# Patient Record
Sex: FEMALE | Race: WHITE | NOT HISPANIC OR LATINO | Employment: FULL TIME | URBAN - METROPOLITAN AREA
[De-identification: names, ages, dates, MRNs, and addresses within clinical notes are randomized per-mention and may not be internally consistent; named-entity substitution may affect disease eponyms.]

---

## 2023-01-13 ENCOUNTER — MEDICAL CORRESPONDENCE (OUTPATIENT)
Dept: HEALTH INFORMATION MANAGEMENT | Facility: CLINIC | Age: 28
End: 2023-01-13

## 2023-01-31 ENCOUNTER — TRANSCRIBE ORDERS (OUTPATIENT)
Dept: MATERNAL FETAL MEDICINE | Facility: CLINIC | Age: 28
End: 2023-01-31

## 2023-01-31 ENCOUNTER — TRANSFERRED RECORDS (OUTPATIENT)
Dept: HEALTH INFORMATION MANAGEMENT | Facility: CLINIC | Age: 28
End: 2023-01-31

## 2023-01-31 DIAGNOSIS — O26.90 PREGNANCY RELATED CONDITION, ANTEPARTUM: Primary | ICD-10-CM

## 2023-02-21 ENCOUNTER — PRE VISIT (OUTPATIENT)
Dept: MATERNAL FETAL MEDICINE | Facility: CLINIC | Age: 28
End: 2023-02-21
Payer: COMMERCIAL

## 2023-02-28 ENCOUNTER — OFFICE VISIT (OUTPATIENT)
Dept: MATERNAL FETAL MEDICINE | Facility: CLINIC | Age: 28
End: 2023-02-28
Attending: ADVANCED PRACTICE MIDWIFE
Payer: COMMERCIAL

## 2023-02-28 ENCOUNTER — OFFICE VISIT (OUTPATIENT)
Dept: MATERNAL FETAL MEDICINE | Facility: CLINIC | Age: 28
End: 2023-02-28
Attending: OBSTETRICS & GYNECOLOGY
Payer: COMMERCIAL

## 2023-02-28 ENCOUNTER — LAB (OUTPATIENT)
Dept: LAB | Facility: CLINIC | Age: 28
End: 2023-02-28
Attending: ADVANCED PRACTICE MIDWIFE
Payer: COMMERCIAL

## 2023-02-28 ENCOUNTER — HOSPITAL ENCOUNTER (OUTPATIENT)
Dept: ULTRASOUND IMAGING | Facility: CLINIC | Age: 28
Discharge: HOME | End: 2023-02-28
Attending: ADVANCED PRACTICE MIDWIFE
Payer: COMMERCIAL

## 2023-02-28 DIAGNOSIS — O35.EXX0 PYELECTASIS OF FETUS ON PRENATAL ULTRASOUND: Primary | ICD-10-CM

## 2023-02-28 DIAGNOSIS — O26.90 PREGNANCY RELATED CONDITION, ANTEPARTUM: ICD-10-CM

## 2023-02-28 DIAGNOSIS — O28.3 ABNORMAL PRENATAL ULTRASOUND: ICD-10-CM

## 2023-02-28 DIAGNOSIS — O28.3 ABNORMAL PRENATAL ULTRASOUND: Primary | ICD-10-CM

## 2023-02-28 DIAGNOSIS — O28.3 ECHOGENIC INTRACARDIAC FOCUS OF FETUS ON PRENATAL ULTRASOUND: ICD-10-CM

## 2023-02-28 PROCEDURE — 76811 OB US DETAILED SNGL FETUS: CPT

## 2023-02-28 PROCEDURE — 76811 OB US DETAILED SNGL FETUS: CPT | Mod: 26 | Performed by: OBSTETRICS & GYNECOLOGY

## 2023-02-28 PROCEDURE — 36415 COLL VENOUS BLD VENIPUNCTURE: CPT

## 2023-02-28 PROCEDURE — 999N000069 HC STATISTIC GENETIC COUNSELING, < 16 MIN: Performed by: GENETIC COUNSELOR, MS

## 2023-02-28 PROCEDURE — 99203 OFFICE O/P NEW LOW 30 MIN: CPT | Mod: 25 | Performed by: OBSTETRICS & GYNECOLOGY

## 2023-02-28 NOTE — PROGRESS NOTES
Please see the imaging tab for details of the ultrasound performed today.    An echogenic intracardiac focus (EIF) was noted on today's ultrasound. While this finding is seen in 3-5% of all pregnancies, it is associated with a likelihood ratio for Trisomy 21 of up to 1.8 fold. We reviewed the limitations of ultrasound and its limitations in detecting aneuploidy. Since she has not yet had genetic screening this pregnancy, we reviewed the availability of cell free DNA screening for risk refinement . After counseling the patient opted to proceed with cell free DNA screening today. A copy of the results will be forwarded to you when the results are available. Finally, we discussed that an EIF has no structural or functional implications on cardiac function and further evaluation is not necessary either prenatally or postnatally.       We reviewed that mild urinary tract dilation is seen in 1-2% of fetuses in the second trimester. This is most commonly a transient finding that resolves on subsequent scans, but may indicate impending kidney disease in about 15% of cases. Urinary tract dilation can occur as a result of a blockage or congenital anatomic abnormality of the kidney, ureter, bladder or urethra, but is most commonly due to transient or physiologic changes. In circumstances when there is reflux or obstruction the urinary tract dilation may result in increased pressure in the calyces and injury to the renal parenchyma. With increasing dilation (specifically > 10mm), there is increasing risk of associated renal/ureteral/bladder/urethral malformations. The cutoff for mild urinary tract dilation is 4 mm in the second trimester and 7 mm in the 3rd trimester.       A follow up ultrasound to reevaluate the fetal kidneys is recommended at 32 weeks and has been scheduled in our office. This ultrasound will determine if ongoing surveillance and  pediatric urology follow-up is indicated. If it has resolved at her  subsequent ultrasound then no  evaluation is indicated.     Edilma Banda MD  Specialist in Maternal-Fetal Medicine

## 2023-02-28 NOTE — NURSING NOTE
Patient presents to [facility] for L2US at 25+5 weeks due to suboptimal anatomy x 2 different attempts in primary clinic. Positive fetal movement. Denies LOF, vaginal bleeding or cramping/contractions. SBAR given to [facility] MD, see their note in Epic.

## 2023-02-28 NOTE — PROGRESS NOTES
[facility]  Genetic Counseling Consult    Patient:  Edilma Galvan YOB: 1995   Date of Service:  23   MRN: [id number]    Edilma was seen at the [facility] for comprehensive ultrasound. I met with the patient following her ultrasound at the request of Dr. Banda to discuss the options for testing for fetal chromosome abnormalities.    IMPRESSION/ PLAN   1. Today's ultrasound revealed an EIF and pyelectasis. Edilma has not had screening in this pregnancy. She has elected to pursue NIPT through [facility] and opted to screen for sex chromosome aneuploidies including fetal sex. Results are expected in 10-14 days. Edilma provided verbal permission for results to be left on her voicemail. She is aware of female sex of baby. Patient was informed that results will also be available via [employer].    PREGNANCY HISTORY   /Parity:    Age at Delivery: 28 year old  Gestational Age: 25w5d   MICHELLE: 2023, by Last Menstrual Period                Family History:   Edilma reported that she has a nephew with disabilities that are secondary to anhydramnios at the end of that pregnancy. Otherwise the reported family history is negative for multiple miscarriages, stillbirths, birth defects, intellectual disability, known genetic conditions, and consanguinity.       RISK ASSESSMENT FOR CHROMOSOME CONDITIONS       At age 28 at midtrimester, the risk to have a baby with Down syndrome is 1 in 990.    At age 28 at midtrimester, the risk to have a baby with any chromosome abnormality is 1 in 495.       Today's ultrasound revealed an EIF and pyelectasis. We reviewed that these findings can be isolated or seen in the setting of a broader underlying genetic etiology such as a chromosome condition. We discussed specific features of common chromosome abnormalities, including Down syndrome, trisomy 13, trisomy 18, and sex  chromosome trisomies. Edilma has not had screening in this pregnancy.     GENETIC TESTING OPTIONS   Genetic testing during a pregnancy includes screening and diagnostic procedures.      We discussed the following screening options:   Non-invasive prenatal testing (NIPT)    Also called cell-free DNA screening because it detects chromosomes from the placenta in the pregnant person's blood    Can be done any time after 10 weeks gestation    Screens for trisomy 21, trisomy 18, trisomy 13, and sex chromosome aneuploidies    Cannot screen for open neural tube defects, maternal serum AFP after 15 weeks is recommended    We discussed the following diagnostic options:   Amniocentesis    Invasive diagnostic procedure done after 15 weeks gestation    The procedure collects a small sample of amniotic fluid for the purpose of chromosomal testing and/or other genetic testing    Diagnostic result; more than 99% sensitivity for fetal chromosome abnormalities    Testing for AFP in the amniotic fluid can test for open neural tube defects      The benefits and limitations of noninvasive screening were reviewed. Screening tests provide a risk assessment (chance) specific to the pregnancy for certain fetal chromosome abnormalities but cannot definitively diagnose or exclude a fetal chromosome abnormality. Follow-up genetic counseling and consideration of diagnostic testing is recommended with any abnormal screening result. Diagnostic testing during a pregnancy is more certain and can test for more conditions. However, the tests do have a risk of miscarriage that requires careful consideration. These tests can detect fetal chromosome abnormalities with greater than 99% certainty. Results can be compromised by maternal cell contamination or mosaicism and are limited by the resolution of current genetic testing technology. There is no screening or diagnostic test that detects all forms of birth defects or intellectual disability.     It  was a pleasure to be involved with Edilma s care. Face-to-face time of the meeting was 15 minutes.    Annette Smith MS, [facility]  Licensed Genetic Counselor  [facility]  Maternal Fetal Medicine  Ph: [phone]  [email or web address]

## 2023-03-07 ENCOUNTER — TELEPHONE (OUTPATIENT)
Dept: MATERNAL FETAL MEDICINE | Facility: CLINIC | Age: 28
End: 2023-03-07
Payer: COMMERCIAL

## 2023-03-07 LAB — SCANNED LAB RESULT: NORMAL

## 2023-03-07 NOTE — TELEPHONE ENCOUNTER
March 7, 2023  Left a message for Edilma regarding her NIPT results.     Results indicate NO ANEUPLOIDY DETECTED for chromosomes 21, 18, 13, or sex chromosomes (XX).     This puts her current pregnancy at low risk for Down syndrome, trisomy 18, trisomy 13 and sex chromosome abnormalities. This test is reported to have the following sensitivities: Down syndrome: 99.99%, trisomy 18: 99.99%, and trisomy 13: 99.99%. Although these results are reassuring, this does not replace a standard chromosome analysis from a chorionic villus sampling or amniocentesis.     Her results are available in her Epic chart for her primary OB to review.    This information was left in Edilma's voicemail per plan established at her genetic counseling appointment, and Edilma was encouraged to reach out if she has any questions or concerns.      Annette Smith MS, [facility]  Licensed Genetic Counselor  [facility]  Maternal Fetal Medicine  Ph: [phone]  [email or web address]

## 2023-04-04 ENCOUNTER — TRANSFERRED RECORDS (OUTPATIENT)
Dept: HEALTH INFORMATION MANAGEMENT | Facility: CLINIC | Age: 28
End: 2023-04-04
Payer: COMMERCIAL

## 2023-04-17 ENCOUNTER — HOSPITAL ENCOUNTER (OUTPATIENT)
Dept: ULTRASOUND IMAGING | Facility: CLINIC | Age: 28
Discharge: HOME | End: 2023-04-17
Attending: OBSTETRICS & GYNECOLOGY
Payer: COMMERCIAL

## 2023-04-17 ENCOUNTER — OFFICE VISIT (OUTPATIENT)
Dept: MATERNAL FETAL MEDICINE | Facility: CLINIC | Age: 28
End: 2023-04-17
Attending: OBSTETRICS & GYNECOLOGY
Payer: COMMERCIAL

## 2023-04-17 DIAGNOSIS — O35.EXX0 ENCOUNTER FOR REPEAT ULTRASOUND OF FETAL PYELECTASIS IN SINGLETON PREGNANCY, ANTEPARTUM: Primary | ICD-10-CM

## 2023-04-17 DIAGNOSIS — O35.EXX0 PYELECTASIS OF FETUS ON PRENATAL ULTRASOUND: ICD-10-CM

## 2023-04-17 PROCEDURE — 76816 OB US FOLLOW-UP PER FETUS: CPT | Mod: 26 | Performed by: OBSTETRICS & GYNECOLOGY

## 2023-04-17 PROCEDURE — 76816 OB US FOLLOW-UP PER FETUS: CPT

## 2023-04-17 NOTE — NURSING NOTE
Patient presents to [facility] for RL2. Positive fetal movement. Denies LOF, vaginal bleeding or cramping/contractions. SBAR given to M MD, see their note in Epic.    Doris Oliver RN

## 2023-04-17 NOTE — PROGRESS NOTES
Please see the imaging tab for details of the ultrasound performed today.    Edilma Banda MD  Specialist in Maternal-Fetal Medicine